# Patient Record
Sex: MALE | Race: WHITE | Employment: OTHER | URBAN - METROPOLITAN AREA
[De-identification: names, ages, dates, MRNs, and addresses within clinical notes are randomized per-mention and may not be internally consistent; named-entity substitution may affect disease eponyms.]

---

## 2018-03-01 ENCOUNTER — HOSPITAL ENCOUNTER (OUTPATIENT)
Dept: ULTRASOUND IMAGING | Age: 73
Discharge: HOME | End: 2018-03-01
Attending: INTERNAL MEDICINE
Payer: MEDICARE

## 2018-03-01 ENCOUNTER — HOSPITAL ENCOUNTER (OUTPATIENT)
Dept: CT IMAGING | Age: 73
Discharge: HOME | End: 2018-03-01
Attending: INTERNAL MEDICINE
Payer: MEDICARE

## 2018-03-01 DIAGNOSIS — I65.23 CAROTID STENOSIS, BILATERAL: ICD-10-CM

## 2018-03-01 DIAGNOSIS — Z91.89 CARDIOVASCULAR RISK FACTOR: ICD-10-CM

## 2018-03-01 PROCEDURE — 93880 EXTRACRANIAL BILAT STUDY: CPT

## 2018-03-01 PROCEDURE — 75571 CT HRT W/O DYE W/CA TEST: CPT

## 2018-03-14 NOTE — PROGRESS NOTES
Coronary calcium score is 85. Mildly elevated. About 40% of men your age may have a score like this. It is not high. If you have no symptoms of chest pain, SOB, exertional fatigue then I would not necessarily do anything, if however you has some of these symptoms, or you have not had a stress test in recent years let me know-I would then refer you to cardiology!

## 2018-03-15 NOTE — PROGRESS NOTES
Spoke to patient's wife, detailed message was relayed and understanding was expressed. Rafaela Girard states that her  does not have any symptoms and that hhis last stress test was maybe 5 years ago.

## 2020-08-28 ENCOUNTER — HOSPITAL ENCOUNTER (OUTPATIENT)
Dept: CT IMAGING | Age: 75
Discharge: HOME | End: 2020-08-28
Attending: NURSE PRACTITIONER
Payer: MEDICARE

## 2020-08-28 DIAGNOSIS — K59.00 CONSTIPATION, UNSPECIFIED: ICD-10-CM

## 2020-08-28 DIAGNOSIS — K57.92 DIVERTICULITIS: ICD-10-CM

## 2020-08-28 PROCEDURE — 74011636320 HC RX REV CODE- 636/320

## 2020-08-28 PROCEDURE — 74011000258 HC RX REV CODE- 258

## 2020-08-28 PROCEDURE — 74011000636 HC RX REV CODE- 636

## 2020-08-28 PROCEDURE — 74177 CT ABD & PELVIS W/CONTRAST: CPT

## 2020-08-28 RX ORDER — SODIUM CHLORIDE 0.9 % (FLUSH) 0.9 %
10 SYRINGE (ML) INJECTION
Status: COMPLETED | OUTPATIENT
Start: 2020-08-28 | End: 2020-08-28

## 2020-08-28 RX ADMIN — SODIUM CHLORIDE 100 ML: 900 INJECTION, SOLUTION INTRAVENOUS at 14:40

## 2020-08-28 RX ADMIN — IOPAMIDOL 100 ML: 755 INJECTION, SOLUTION INTRAVENOUS at 14:40

## 2020-08-28 RX ADMIN — DIATRIZOATE MEGLUMINE AND DIATRIZOATE SODIUM 15 ML: 660; 100 LIQUID ORAL; RECTAL at 14:40

## 2020-08-28 RX ADMIN — Medication 10 ML: at 14:40
